# Patient Record
Sex: FEMALE | Race: AMERICAN INDIAN OR ALASKA NATIVE
[De-identification: names, ages, dates, MRNs, and addresses within clinical notes are randomized per-mention and may not be internally consistent; named-entity substitution may affect disease eponyms.]

---

## 2021-01-16 ENCOUNTER — HOSPITAL ENCOUNTER (EMERGENCY)
Dept: HOSPITAL 5 - ED | Age: 31
Discharge: LEFT BEFORE DISCHARGE | End: 2021-01-16
Payer: SELF-PAY

## 2021-01-16 VITALS — SYSTOLIC BLOOD PRESSURE: 120 MMHG | DIASTOLIC BLOOD PRESSURE: 67 MMHG

## 2021-01-16 DIAGNOSIS — Z53.21: ICD-10-CM

## 2021-01-16 DIAGNOSIS — R52: Primary | ICD-10-CM

## 2021-01-16 LAB
BUN SERPL-MCNC: 7 MG/DL (ref 7–17)
BUN/CREAT SERPL: 12 %
CALCIUM SERPL-MCNC: 9.1 MG/DL (ref 8.4–10.2)
HCT VFR BLD CALC: 33.9 % (ref 30.3–42.9)
HEMOLYSIS INDEX: 16
HGB BLD-MCNC: 11.2 GM/DL (ref 10.1–14.3)
MCHC RBC AUTO-ENTMCNC: 33 % (ref 30–34)
MCV RBC AUTO: 79 FL (ref 79–97)
PLATELET # BLD: 284 K/MM3 (ref 140–440)
RBC # BLD AUTO: 4.31 M/MM3 (ref 3.65–5.03)

## 2021-01-16 PROCEDURE — 80048 BASIC METABOLIC PNL TOTAL CA: CPT

## 2021-01-16 PROCEDURE — 85027 COMPLETE CBC AUTOMATED: CPT

## 2021-01-16 PROCEDURE — 36415 COLL VENOUS BLD VENIPUNCTURE: CPT
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
## 2022-02-08 ENCOUNTER — HOSPITAL ENCOUNTER (EMERGENCY)
Dept: HOSPITAL 5 - ED | Age: 32
LOS: 1 days | Discharge: HOME | End: 2022-02-09
Payer: SELF-PAY

## 2022-02-08 VITALS — SYSTOLIC BLOOD PRESSURE: 136 MMHG | DIASTOLIC BLOOD PRESSURE: 60 MMHG

## 2022-02-08 DIAGNOSIS — R07.9: Primary | ICD-10-CM

## 2022-02-08 DIAGNOSIS — Z87.891: ICD-10-CM

## 2022-02-08 DIAGNOSIS — I10: ICD-10-CM

## 2022-02-08 DIAGNOSIS — Z88.6: ICD-10-CM

## 2022-02-08 LAB
ALBUMIN SERPL-MCNC: 4.4 G/DL (ref 3.9–5)
ALT SERPL-CCNC: 13 UNITS/L (ref 7–56)
BASOPHILS # (AUTO): 0.1 K/MM3 (ref 0–0.1)
BASOPHILS NFR BLD AUTO: 0.6 % (ref 0–1.8)
BUN SERPL-MCNC: 6 MG/DL (ref 7–17)
BUN/CREAT SERPL: 12 %
CALCIUM SERPL-MCNC: 9.3 MG/DL (ref 8.4–10.2)
EOSINOPHIL # BLD AUTO: 0.1 K/MM3 (ref 0–0.4)
EOSINOPHIL NFR BLD AUTO: 0.9 % (ref 0–4.3)
HCT VFR BLD CALC: 31.7 % (ref 30.3–42.9)
HEMOLYSIS INDEX: 4
HGB BLD-MCNC: 9.7 GM/DL (ref 10.1–14.3)
LYMPHOCYTES # BLD AUTO: 1.5 K/MM3 (ref 1.2–5.4)
LYMPHOCYTES NFR BLD AUTO: 17.2 % (ref 13.4–35)
MCHC RBC AUTO-ENTMCNC: 31 % (ref 30–34)
MCV RBC AUTO: 74 FL (ref 79–97)
MONOCYTES # (AUTO): 0.7 K/MM3 (ref 0–0.8)
MONOCYTES % (AUTO): 8.3 % (ref 0–7.3)
PLATELET # BLD: 478 K/MM3 (ref 140–440)
RBC # BLD AUTO: 4.3 M/MM3 (ref 3.65–5.03)

## 2022-02-08 PROCEDURE — 93010 ELECTROCARDIOGRAM REPORT: CPT

## 2022-02-08 PROCEDURE — 36415 COLL VENOUS BLD VENIPUNCTURE: CPT

## 2022-02-08 PROCEDURE — 71046 X-RAY EXAM CHEST 2 VIEWS: CPT

## 2022-02-08 PROCEDURE — 85025 COMPLETE CBC W/AUTO DIFF WBC: CPT

## 2022-02-08 PROCEDURE — 80053 COMPREHEN METABOLIC PANEL: CPT

## 2022-02-08 PROCEDURE — 84484 ASSAY OF TROPONIN QUANT: CPT

## 2022-02-08 PROCEDURE — 84703 CHORIONIC GONADOTROPIN ASSAY: CPT

## 2022-02-08 PROCEDURE — 93005 ELECTROCARDIOGRAM TRACING: CPT

## 2022-02-08 PROCEDURE — 99284 EMERGENCY DEPT VISIT MOD MDM: CPT

## 2022-02-08 NOTE — XRAY REPORT
CHEST 2 VIEWS 



INDICATION / CLINICAL INFORMATION: Chest Pain.



COMPARISON: None available.



FINDINGS:



SUPPORT DEVICES: None.

HEART / MEDIASTINUM: No significant abnormality. 

LUNGS / PLEURA: No significant pulmonary or pleural abnormality. No pneumothorax. 



ADDITIONAL FINDINGS: No significant additional findings.



IMPRESSION:

1.  No acute findings.



Signer Name: Alejandro Iqbal MD 

Signed: 2/8/2022 9:06 PM

Workstation Name: [id number]

## 2022-02-08 NOTE — EMERGENCY DEPARTMENT REPORT
ED Chest Pain HPI





- General


Chief Complaint: Chest Pain


Stated Complaint: CHEST PAIN/SOB


Time Seen by Provider: 22 20:12


Source: patient


Mode of arrival: Ambulatory


Limitations: No Limitations





- History of Present Illness


Initial Comments: 


A 31-year-old American female with history of anxiety, surgical history of tubal

ligation, who presents for sided chest pain radiating to left arm with tingling 

and numbness x1 month.  Patient denies symptoms exacerbated by activity movement

and deep breathing.  Symptoms are relieved by nothing tried.  Patient states 

intermittent nausea dizziness at this time.  Patient denies suspicious contacts 

or travel.  There is no shortness of breath.  Patient a 10-pack-year smoker, 

occasional EtOH, denies other history.  Patient denies other exacerbating or 

relieving factors.  Chest pain rated at 4/10 at this time.


MD Complaint: chest pain


Severity scale (0 -10): 5





- Related Data


                                  Previous Rx's











 Medication  Instructions  Recorded  Last Taken  Type


 


Ibuprofen [Motrin 800 MG tab] 800 mg PO Q8HR PRN #30 tablet 22 Unknown Rx











                                    Allergies











Allergy/AdvReac Type Severity Reaction Status Date / Time


 


morphine Allergy  Swelling Verified 10/08/15 15:02














Heart Score





- HEART Score


History: Slightly suspicious


EKG: Normal


Age: < 45


Risk factors: No known risk factors


Troponin: < normal limit


HEART Score: 0





- EKG Read Time


Time EKG Completed: 19:40


EKG Read Time: 19:45





ED Review of Systems


ROS: 


Stated complaint: CHEST PAIN/SOB


Other details as noted in HPI





Constitutional: malaise.  denies: chills, fever


Eyes: denies: eye pain, eye discharge, vision change


ENT: denies: ear pain, throat pain


Respiratory: cough.  denies: shortness of breath, wheezing


Cardiovascular: chest pain


Endocrine: no symptoms reported


Gastrointestinal: as per HPI, nausea, constipation.  denies: abdominal pain, 

vomiting, diarrhea, hematemesis, melena


Genitourinary: denies: urgency, dysuria, frequency, hematuria, discharge


Musculoskeletal: denies: back pain, joint swelling, arthralgia


Skin: denies: rash, lesions


Neurological: denies: headache, weakness, numbness, paresthesias, confusion, 

vertigo


Psychiatric: anxiety.  denies: depression


Hematological/Lymphatic: denies: easy bleeding, easy bruising





ED Past Medical Hx





- Past Medical History


Previous Medical History?: Yes


Hx Hypertension: Yes


Hx Congestive Heart Failure: No


Hx Diabetes: No


Hx Deep Vein Thrombosis: No


Hx Renal Disease: No


Hx Sickle Cell Disease: No


Hx Seizures: No


Hx Asthma: No


Hx COPD: No


Hx HIV: No


Additional medical history: blood transfusion .  UTERINE ABRUPTION 2015





- Surgical History


Past Surgical History?: Yes


Additional Surgical History:  X 3





- Social History


Smoking Status: Former Smoker





- Medications


Home Medications: 


                                Home Medications











 Medication  Instructions  Recorded  Confirmed  Last Taken  Type


 


Ibuprofen [Motrin 800 MG tab] 800 mg PO Q8HR PRN #30 tablet 22  Unknown Rx














ED Physical Exam





- General


Limitations: No Limitations


General appearance: alert, in no apparent distress





- Head


Head exam: Present: atraumatic, normocephalic





- Eye


Eye exam: Present: normal appearance, EOMI


Pupils: Present: normal accommodation





- ENT


ENT exam: Present: mucous membranes moist





- Neck


Neck exam: Present: normal inspection, full ROM.  Absent: tenderness, 

lymphadenopathy, thyromegaly





- Respiratory


Respiratory exam: Present: normal lung sounds bilaterally, chest wall 

tenderness.  Absent: respiratory distress, wheezes, rales, rhonchi, stridor





- Cardiovascular


Cardiovascular Exam: Present: regular rate, normal rhythm, normal heart sounds. 

Absent: systolic murmur, diastolic murmur, rubs, gallop





- GI/Abdominal


GI/Abdominal exam: Present: soft, normal bowel sounds.  Absent: distended, 

tenderness, guarding, rebound, rigid, mass, bruit, hernia





- Rectal


Rectal exam: Present: deferred





- Extremities Exam


Extremities exam: Present: normal inspection, full ROM, normal capillary refill.

 Absent: tenderness, pedal edema





- Back Exam


Back exam: Present: normal inspection, full ROM.  Absent: CVA tenderness (R), 

CVA tenderness (L)





- Neurological Exam


Neurological exam: Present: alert, oriented X3, CN II-XII intact, normal gait





- Expanded Neurological Exam


  ** Expanded


Patient oriented to: Present: person, place, time


Speech: Present: fluid speech


Motor strength exam: RUE: 5, LUE: 5, RLE: 5, LLE: 5


Best Eye Response (Sukumar): (4) open spontaneously


Best Motor Response (Sukumar): (6) obeys commands


Best Verbal Response (Sukumar): (5) oriented


Sukumar Total: 15





- Psychiatric


Psychiatric exam: Present: normal affect, normal mood





- Skin


Skin exam: Present: warm, dry, intact, normal color.  Absent: rash





ED Course


                                   Vital Signs











  22





  19:33 22:38


 


Temperature 98.6 F 


 


Pulse Rate 89 


 


Respiratory 20 16





Rate  


 


Blood Pressure 136/60 





[Right]  


 


O2 Sat by Pulse 100 





Oximetry  














ABHI score





- Abhi Score


Age > 65: (0) No


Aspirin use within the Past 7 Days: (0) No


3 or more CAD Risk Factors: (0) No


2 or more Angina events in past 24 hrs: (0) No


Known CAD with more than 50% Stenosis: (0) No


Elevated Cardiac Markers: (0) No


ST Deviation Greater than 0.5mm: (0) No


ABHI Score: 0





ED Medical Decision Making





- Lab Data


Result diagrams: 


                                 22 19:58





                                 22 19:58








Labs











  22





  19:58 19:58 19:58


 


WBC   8.6 


 


RBC   4.30 


 


Hgb   9.7 L 


 


Hct   31.7 


 


MCV   74 L 


 


MCH   23 L 


 


MCHC   31 


 


RDW   19.3 H 


 


Plt Count   478 H 


 


Lymph % (Auto)   17.2 


 


Mono % (Auto)   8.3 H 


 


Eos % (Auto)   0.9 


 


Baso % (Auto)   0.6 


 


Lymph # (Auto)   1.5 


 


Mono # (Auto)   0.7 


 


Eos # (Auto)   0.1 


 


Baso # (Auto)   0.1 


 


Seg Neutrophils %   73.0 H 


 


Seg Neutrophils #   6.3 


 


Sodium    140


 


Potassium    4.3


 


Chloride    104.2


 


Carbon Dioxide    20 L


 


Anion Gap    20


 


BUN    6 L


 


Creatinine    0.5 L


 


Estimated GFR    > 60


 


BUN/Creatinine Ratio    12


 


Glucose    93


 


Calcium    9.3


 


Total Bilirubin    < 0.20


 


AST    18


 


ALT    13


 


Alkaline Phosphatase    84


 


Troponin T    < 0.010


 


Total Protein    7.8


 


Albumin    4.4


 


Albumin/Globulin Ratio    1.3


 


HCG, Qual  Negative  














  22





  23:01


 


WBC 


 


RBC 


 


Hgb 


 


Hct 


 


MCV 


 


MCH 


 


MCHC 


 


RDW 


 


Plt Count 


 


Lymph % (Auto) 


 


Mono % (Auto) 


 


Eos % (Auto) 


 


Baso % (Auto) 


 


Lymph # (Auto) 


 


Mono # (Auto) 


 


Eos # (Auto) 


 


Baso # (Auto) 


 


Seg Neutrophils % 


 


Seg Neutrophils # 


 


Sodium 


 


Potassium 


 


Chloride 


 


Carbon Dioxide 


 


Anion Gap 


 


BUN 


 


Creatinine 


 


Estimated GFR 


 


BUN/Creatinine Ratio 


 


Glucose 


 


Calcium 


 


Total Bilirubin 


 


AST 


 


ALT 


 


Alkaline Phosphatase 


 


Troponin T  < 0.010


 


Total Protein 


 


Albumin 


 


Albumin/Globulin Ratio 


 


HCG, Qual 














- EKG Data


EKG shows normal: sinus rhythm, axis, intervals, QRS complexes, ST-T waves


Rate: normal





- EKG Data


When compared to previous EKG there are: previous EKG unavailable


Interpretation: normal EKG (NSR No STEMI, interp by ed attending)





- Radiology Data


Radiology results: report reviewed, image reviewed


 


Fluoro Time In Minutes:   


 


CHEST 2 VIEWS   


 


 INDICATION / CLINICAL INFORMATION: Chest Pain.  


 


 COMPARISON: None available.  


 


 FINDINGS:  


 


 SUPPORT DEVICES: None.  


 HEART / MEDIASTINUM: No significant abnormality.   


 LUNGS / PLEURA: No significant pulmonary or pleural abnormality. No 

pneumothorax.   


 


 ADDITIONAL FINDINGS: No significant additional findings.  


 


 IMPRESSION:  


 1.  No acute findings.  


 


 Signer Name: Alejandro Arreguin MD   


 Signed: 2022 9:06 PM  


 Workstation Name: [id number]   


 


 


Transcribed By: SB  


Dictated By: ALEJANDRO ARREGUIN MD  


Electronically Authenticated By: ALEJANDRO ARREGUIN MD    


Signed Date/Time: 22                                


 


 


 


DD/DT: 22                                                            

  


TD/TT:


Print





Critical care attestation.: 


If time is entered above; I have spent that time in minutes in the direct care 

of this critically ill patient, excluding procedure time.








ED Disposition


Clinical Impression: 


Chest pain


Qualifiers:


 Chest pain type: unspecified Qualified Code(s): R07.9 - Chest pain, unspecified





Disposition:  HOME / SELF CARE / HOMELESS


Is pt being admited?: No


Does the pt Need Aspirin: No


Condition: Stable


Instructions:  Nonspecific Chest Pain, Adult, Chest Wall Pain


Additional Instructions: 


Medication as prescribed, follow-up with your doctor in 2 to 3 days.  Return to 

emergency department should symptoms worsen.


Prescriptions: 


Ibuprofen [Motrin 800 MG tab] 800 mg PO Q8HR PRN #30 tablet


 PRN Reason: Pain


Referrals: 


NANCY SANZ MD [Staff Physician] - 3-5 Days


Forms:  Work/School Release Form(ED)


Time of Disposition: 01:32

## 2022-02-09 NOTE — ELECTROCARDIOGRAPH REPORT
[facility]

                                       

Test Date:    2022               Test Time:    19:36:35

Pat Name:     MARLEN QUINTANILLA         Department:   

Patient ID:   [id number]          Room:          

Gender:       F                        Technician:   VAN

:          1990               Requested By: MICKY LA

Order Number: [id number]              Reading MD:   Georges Davies

                                 Measurements

Intervals                              Axis          

Rate:         80                       P:            57

LA:           137                      QRS:          69

QRSD:         89                       T:            36

QT:           386                                    

QTc:          445                                    

                           Interpretive Statements

Sinus rhythm

No previous ECG available for comparison

Electronically Signed On 2022 9:58:06 EST by Georges Davies

## 2022-06-13 ENCOUNTER — HOSPITAL ENCOUNTER (EMERGENCY)
Dept: HOSPITAL 5 - ED | Age: 32
LOS: 1 days | Discharge: LEFT BEFORE DISCHARGE | End: 2022-06-14
Payer: SELF-PAY

## 2022-06-13 DIAGNOSIS — R00.0: Primary | ICD-10-CM

## 2022-06-13 DIAGNOSIS — Z53.21: ICD-10-CM

## 2022-06-13 LAB
ALBUMIN SERPL-MCNC: 4.3 G/DL (ref 3.9–5)
ALT SERPL-CCNC: 9 UNITS/L (ref 7–56)
BASOPHILS # (AUTO): 0.1 K/MM3 (ref 0–0.1)
BASOPHILS NFR BLD AUTO: 0.7 % (ref 0–1.8)
BUN SERPL-MCNC: 8 MG/DL (ref 7–17)
BUN/CREAT SERPL: 16 %
CALCIUM SERPL-MCNC: 8.9 MG/DL (ref 8.4–10.2)
EOSINOPHIL # BLD AUTO: 0.1 K/MM3 (ref 0–0.4)
EOSINOPHIL NFR BLD AUTO: 1 % (ref 0–4.3)
HCT VFR BLD CALC: 30 % (ref 30.3–42.9)
HEMOLYSIS INDEX: 0
HGB BLD-MCNC: 9.4 GM/DL (ref 10.1–14.3)
LYMPHOCYTES # BLD AUTO: 1.5 K/MM3 (ref 1.2–5.4)
LYMPHOCYTES NFR BLD AUTO: 19.9 % (ref 13.4–35)
MCHC RBC AUTO-ENTMCNC: 31 % (ref 30–34)
MCV RBC AUTO: 76 FL (ref 79–97)
MONOCYTES # (AUTO): 0.6 K/MM3 (ref 0–0.8)
MONOCYTES % (AUTO): 7.9 % (ref 0–7.3)
PLATELET # BLD: 349 K/MM3 (ref 140–440)
RBC # BLD AUTO: 3.94 M/MM3 (ref 3.65–5.03)

## 2022-06-13 PROCEDURE — 84484 ASSAY OF TROPONIN QUANT: CPT

## 2022-06-13 PROCEDURE — 85379 FIBRIN DEGRADATION QUANT: CPT

## 2022-06-13 PROCEDURE — 93005 ELECTROCARDIOGRAM TRACING: CPT

## 2022-06-13 PROCEDURE — 36415 COLL VENOUS BLD VENIPUNCTURE: CPT

## 2022-06-13 PROCEDURE — 85025 COMPLETE CBC W/AUTO DIFF WBC: CPT

## 2022-06-13 PROCEDURE — 80053 COMPREHEN METABOLIC PANEL: CPT

## 2022-06-13 PROCEDURE — 71046 X-RAY EXAM CHEST 2 VIEWS: CPT

## 2022-06-13 NOTE — XRAY REPORT
CHEST 2 VIEWS 



INDICATION / CLINICAL INFORMATION:

Chest Pain.



COMPARISON: 

2/8/2022



FINDINGS:



SUPPORT DEVICES: None.



HEART / MEDIASTINUM: No significant abnormality. 



LUNGS / PLEURA: No significant pulmonary or pleural abnormality. No pneumothorax. 



ADDITIONAL FINDINGS: No significant additional findings.



IMPRESSION:

1. No acute findings.



Signer Name: Joseph Falcon Jr, MD 

Signed: 6/13/2022 11:14 AM

Workstation Name: [id number]

## 2022-06-14 NOTE — ELECTROCARDIOGRAPH REPORT
[facility]

                                       

Test Date:    2022               Test Time:    10:48:54

Pat Name:     MARLEN QUINTANILLA         Department:   

Patient ID:   [id number]          Room:          

Gender:       F                        Technician:   REGULO

:          1990               Requested By: GURDEEP JONES

Order Number: [id number]              Reading MD:   Georges Davies

                                 Measurements

Intervals                              Axis          

Rate:         82                       P:            58

MS:           139                      QRS:          47

QRSD:         94                       T:            16

QT:           377                                    

QTc:          439                                    

                           Interpretive Statements

Sinus rhythm

Compared to ECG 2022 19:36:35

No significant changes

Electronically Signed On 2022 10:03:40 EDT by Georges Davies

## 2022-07-31 ENCOUNTER — HOSPITAL ENCOUNTER (INPATIENT)
Dept: HOSPITAL 5 - ED | Age: 32
LOS: 1 days | Discharge: HOME | DRG: 394 | End: 2022-08-01
Attending: INTERNAL MEDICINE | Admitting: INTERNAL MEDICINE
Payer: SELF-PAY

## 2022-07-31 DIAGNOSIS — N39.0: ICD-10-CM

## 2022-07-31 DIAGNOSIS — E87.1: ICD-10-CM

## 2022-07-31 DIAGNOSIS — D25.9: ICD-10-CM

## 2022-07-31 DIAGNOSIS — I10: ICD-10-CM

## 2022-07-31 DIAGNOSIS — D50.9: ICD-10-CM

## 2022-07-31 DIAGNOSIS — K35.80: Primary | ICD-10-CM

## 2022-07-31 LAB
ALBUMIN SERPL-MCNC: 4.2 G/DL (ref 3.9–5)
ALT SERPL-CCNC: 10 UNITS/L (ref 7–56)
BACTERIA #/AREA URNS HPF: (no result) /HPF
BASOPHILS # (AUTO): 0.1 K/MM3 (ref 0–0.1)
BASOPHILS NFR BLD AUTO: 0.7 % (ref 0–1.8)
BILIRUB DIRECT SERPL-MCNC: < 0.2 MG/DL (ref 0–0.2)
BUN SERPL-MCNC: 5 MG/DL (ref 7–17)
BUN/CREAT SERPL: 10 %
CALCIUM SERPL-MCNC: 9.6 MG/DL (ref 8.4–10.2)
EOSINOPHIL # BLD AUTO: 0.1 K/MM3 (ref 0–0.4)
EOSINOPHIL NFR BLD AUTO: 1.1 % (ref 0–4.3)
HCT VFR BLD CALC: 30.5 % (ref 30.3–42.9)
HEMOLYSIS INDEX: 6
HGB BLD-MCNC: 9.6 GM/DL (ref 10.1–14.3)
LYMPHOCYTES # BLD AUTO: 1.8 K/MM3 (ref 1.2–5.4)
LYMPHOCYTES NFR BLD AUTO: 20.5 % (ref 13.4–35)
MCHC RBC AUTO-ENTMCNC: 31 % (ref 30–34)
MCV RBC AUTO: 75 FL (ref 79–97)
MONOCYTES # (AUTO): 0.7 K/MM3 (ref 0–0.8)
MONOCYTES % (AUTO): 8.1 % (ref 0–7.3)
MUCOUS THREADS #/AREA URNS HPF: (no result) /HPF
PH UR STRIP: 6 [PH] (ref 5–7)
PLATELET # BLD: 467 K/MM3 (ref 140–440)
PROT UR STRIP-MCNC: >500 MG/DL
RBC # BLD AUTO: 4.05 M/MM3 (ref 3.65–5.03)
RBC #/AREA URNS HPF: > 182 /HPF (ref 0–6)
UROBILINOGEN UR-MCNC: 2 MG/DL (ref ?–2)
WBC #/AREA URNS HPF: > 182 /HPF (ref 0–6)

## 2022-07-31 PROCEDURE — 74177 CT ABD & PELVIS W/CONTRAST: CPT

## 2022-07-31 PROCEDURE — 83690 ASSAY OF LIPASE: CPT

## 2022-07-31 PROCEDURE — 80048 BASIC METABOLIC PNL TOTAL CA: CPT

## 2022-07-31 PROCEDURE — 93975 VASCULAR STUDY: CPT

## 2022-07-31 PROCEDURE — 36415 COLL VENOUS BLD VENIPUNCTURE: CPT

## 2022-07-31 PROCEDURE — 85025 COMPLETE CBC W/AUTO DIFF WBC: CPT

## 2022-07-31 PROCEDURE — 80076 HEPATIC FUNCTION PANEL: CPT

## 2022-07-31 PROCEDURE — 81001 URINALYSIS AUTO W/SCOPE: CPT

## 2022-07-31 PROCEDURE — 76830 TRANSVAGINAL US NON-OB: CPT

## 2022-07-31 PROCEDURE — 84703 CHORIONIC GONADOTROPIN ASSAY: CPT

## 2022-07-31 PROCEDURE — 80053 COMPREHEN METABOLIC PANEL: CPT

## 2022-07-31 RX ADMIN — Medication SCH ML: at 23:41

## 2022-07-31 RX ADMIN — PIPERACILLIN AND TAZOBACTAM SCH MLS/HR: 4; .5 INJECTION, POWDER, FOR SOLUTION INTRAVENOUS at 23:30

## 2022-07-31 RX ADMIN — HEPARIN SODIUM SCH UNIT: 5000 INJECTION, SOLUTION INTRAVENOUS; SUBCUTANEOUS at 23:41

## 2022-07-31 NOTE — ULTRASOUND REPORT
EXAM: COMPLETE PELVIC ULTRASOUND, 7/31/2022



CLINICAL INFORMATION/INDICATION: Pelvic pain and vaginal bleeding



COMPARISON: No relevant prior study is available for comparison.



FINDINGS:



UTERUS: The uterus measures 10.0 x 5.0 x 6.4 cm. The endometrial complex measures a maximum of 3 mm. 
There is a hypoechoic focus within the uterine body measuring a maximum of 1 cm.



RIGHT ADNEXA: The right ovary measures 3.3 x 1.1 cm. Doppler flow is demonstrated to the right adnexa
l region.



LEFT ADNEXA: The left ovary measures 2.3 x 1.4 cm. Doppler flow is demonstrated to the left adnexal r
egion.



FREE FLUID: No free pelvic fluid is identified.



IMPRESSION:

1.  No sonographic evidence of acute intrapelvic abnormality.

2. Single hypoechoic focus within the uterine body measuring a maximum of 1 cm. This may represent a 
small uterine fibroid.



Signer Name: Paris Duffy MD 

Signed: 7/31/2022 1:46 PM

Workstation Name: [id number]

## 2022-07-31 NOTE — EMERGENCY DEPARTMENT REPORT
ED General Adult HPI





- General


Chief complaint: Abdominal Pain


Stated complaint: BLEEDING


Source: patient, RN notes reviewed, old records reviewed


Mode of arrival: Ambulatory


Limitations: No Limitations





- History of Present Illness


Initial comments: 





The patient was evaluated in the emergency department for symptoms described in 

the history of present illness.  He/she was evaluated in the context of the 

global COVID-19 pandemic, which necessitated consideration that the patient 

might be at risk for infection with the virus that causes COVID-19.  

Institutional protocols and algorithms that pertain to the evaluation of 

patients at risk for COVID-19 are in a state of rapid change based on 

information released by regulatory bodies including the CDC and federal and 

state organizations.  These policies and algorithms were followed during the 

patient's care in the emergency department.  Please note that these policies, 

procedures and recommendations changed on a rapid basis.








During the history and physical examination I am chaperoned by nurse  

NILS LOERA








This is a 31-year-old female who presents to the department today with a primary

complaint of right flank, right lower quadrant pain, right posterior flank pain,

present for 4 years, this got worse over the past week.  She also endorses 

vaginal bleeding.  Questionable dysuria.  No history of vaginal discharge.  Also

endorses nonspecific right-sided foot numbness.





Denies headache, neck pain, chest pain, hematemesis and bright red blood per 

rectum.  No recent antibiotic use


-: Gradual, days(s), year(s)


Location: abdomen


Radiation: abdomen


Quality: aching


Consistency: intermittent


Improves with: rest


Worsens with: movement (Movement and palpation)





- Related Data


                                  Previous Rx's











 Medication  Instructions  Recorded  Last Taken  Type


 


Ibuprofen [Motrin 800 MG tab] 800 mg PO Q8HR PRN #30 tablet 22 Unknown Rx


 


hydrOXYzine HCL [Atarax] 10 mg PO Q6HR PRN #12 tablet 22 Unknown Rx











                                    Allergies











Allergy/AdvReac Type Severity Reaction Status Date / Time


 


morphine Allergy  Swelling Verified 10/08/15 15:02














ED Review of Systems


ROS: 


Stated complaint: BLEEDING


Other details as noted in HPI





Constitutional: denies: fever


Eyes: denies: eye discharge


ENT: denies: epistaxis


Respiratory: denies: cough


Cardiovascular: denies: chest pain


Gastrointestinal: abdominal pain, nausea, vomiting, constipation


Genitourinary: dysuria, hematuria


Musculoskeletal: back pain


Neurological: denies: weakness


Hematological/Lymphatic: denies: easy bleeding





ED Past Medical Hx





- Past Medical History


Hx Hypertension: Yes


Hx Congestive Heart Failure: No


Hx Diabetes: No


Hx Deep Vein Thrombosis: No


Hx Renal Disease: No


Hx Sickle Cell Disease: No


Hx Seizures: No


Hx Asthma: No


Hx COPD: No


Hx HIV: No


Additional medical history: blood transfusion .  UTERINE ABRUPTION 2015.  anemia





- Surgical History


Additional Surgical History:  X 3





- Social History


Smoking Status: Never Smoker


Substance Use Type: None





- Medications


Home Medications: 


                                Home Medications











 Medication  Instructions  Recorded  Confirmed  Last Taken  Type


 


Ibuprofen [Motrin 800 MG tab] 800 mg PO Q8HR PRN #30 tablet 22  Unknown Rx


 


hydrOXYzine HCL [Atarax] 10 mg PO Q6HR PRN #12 tablet 22  Unknown Rx














ED Physical Exam





- General


Limitations: No Limitations


General appearance: alert, in no apparent distress, obese





- Head


Head exam: Present: atraumatic, normocephalic





- Eye


Eye exam: Present: normal appearance, EOMI.  Absent: nystagmus





- ENT


ENT exam: Present: normal exam, normal orophraynx, mucous membranes moist, 

normal external ear exam





- Neck


Neck exam: Present: normal inspection, full ROM.  Absent: tenderness, 

meningismus





- Respiratory


Respiratory exam: Present: normal lung sounds bilaterally.  Absent: respiratory 

distress, wheezes, rales, rhonchi, stridor, decreased breath sounds





- Cardiovascular


Cardiovascular Exam: Present: regular rate, normal rhythm, normal heart sounds. 

 Absent: bradycardia, tachycardia, irregular rhythm, systolic murmur, diastolic 

murmur, rubs, gallop





- GI/Abdominal


GI/Abdominal exam: Present: soft, tenderness, other (There is right upper 

quadrant tenderness.  There is right flank and right lower quadrant tenderness).

  Absent: distended, guarding, rebound, rigid, pulsatile mass





- 


External exam: Present: normal external exam, bleeding


Speculum exam: Present: normal speculum exam, vaginal bleeding.  Absent: erythem

a, vaginal discharge, cervical discharge, tissue, laceration


Bi-manual exam: Present: normal bi-manual exam, uterine tenderness, other 

(Chaperoned by nurse NICOLASA HARPER).  Absent: cervical motion tendernes, 

adnexal tenderness, adnexal mass, uterine enlargement





- Extremities Exam


Extremities exam: Present: normal inspection, full ROM, other (2+ pulses noted 

in the bilateral upper and lower extremities.  There is no palpable cord.   

negative Homans sign.  Muscular compartments are soft.  The pelvis is stable.). 

 Absent: pedal edema, calf tenderness





- Back Exam


Back exam: Present: normal inspection.  Absent: tenderness, CVA tenderness (R), 

CVA tenderness (L), paraspinal tenderness





- Neurological Exam


Neurological exam: Present: alert, oriented X3, other (No facial droop.  Tongue 

midline.  Extraocular movements intact bilaterally.  Facial sensation intact to 

light touch in V1, V2, V3 distribution bilaterally.  5 and a 5 strength in 4 

extremities.  Sensation intact to light touch in 4 extremities.).  Absent: motor

 sensory deficit





- Psychiatric


Psychiatric exam: Present: normal affect, normal mood





- Skin


Skin exam: Present: warm, dry, intact, normal color.  Absent: rash





ED Course


                                   Vital Signs











  22





  04:45 12:34 12:46


 


Temperature 98.9 F  


 


Pulse Rate 85  


 


Respiratory 18  





Rate   


 


Blood Pressure 125/62  121/50


 


O2 Sat by Pulse 100 100 100





Oximetry   


 


O2 Sat by Pulse   





Oximetry [   





Digit-Finger]   














  22





  13:34 13:45 14:18


 


Temperature   


 


Pulse Rate  59 L 


 


Respiratory  16 18





Rate   


 


Blood Pressure 111/50 121/50 


 


O2 Sat by Pulse 100 99 100





Oximetry   


 


O2 Sat by Pulse   





Oximetry [   





Digit-Finger]   














  22





  14:42


 


Temperature 


 


Pulse Rate 


 


Respiratory 





Rate 


 


Blood Pressure 


 


O2 Sat by Pulse 





Oximetry 


 


O2 Sat by Pulse 99





Oximetry [ 





Digit-Finger] 














- Reevaluation(s)


Reevaluation #1: 





22 13:01


Differential diagnosis, including but not limited to: Biliary colic, 

cholecystitis, hepatitis, pancreatitis, renal colic, pyelonephritis, 

appendicitis, dysfunctional uterine bleeding, urinary tract infection











Assessment and plan: 31-year-old female presenting to the department today with 

a complaint of abdominal pain and flank pain, dysuria, and vaginal bleeding.  We

will treat her symptoms, and obtain CT scan abdomen pelvis, pelvic ultrasound, 

and perform a gynecologic examination.  Morphine "allergy" appreciated, patient 

has received Dilaudid as well as oral opioids at this hospital in the past.  We 

will medicate her with Toradol as well as fentanyl.





Add on lipase and hepatic panel at this time.  We will also perform a 

gynecologic examination which the patient has provided consent for.  All 

questions have been answered.  Currently awaiting diagnostic studies


Reevaluation #2: 





22 13:03


GCS 15, NIH score of 0, downgoing plantar reflexes, 2+ quadriceps and biceps 

reflexes bilaterally, nonfocal and unremarkable neurologic examination.  

Expectant management and outpatient follow-up for history of nonspecific foot 

numbness


22 14:41


Nursing team reports to myself that patient's  brought the patient c

[employer] fast food to eat, in spite of an n.p.o. order.  Nursing team further 

reports that the patient was able to eat without difficulty.  Awaiting CT scan 

abdomen pelvis.  We will also perform gynecologic examination


22 15:19


Pelvic examination benign and unremarkable.  CT scan suggests early acute 

appendicitis.  Patient is agreeable to admission and hospitalization for 

supportive care, fluids and antibiotics.  Have again advised patient to not eat 

anything at this time.  Contacted general surgery on-call, Dr. Newsome.  Have 

discussed the patient's history, physical, laboratory studies and imaging 

studies.  Indicates that he can follow in consultation.  Request n.p.o. after 

midnight, and clear liquid diet at this time.





Hospital physician, Dr. JORGE Lopez to admit to [facility]





- Pulse Oximetry Interpretation


  ** Digit-Finger


Initial Pulse Oximetry Readin


O2 Sat by Pulse Oximetry: 99


Actions Taken: none





ED Medical Decision Making





- Lab Data


Result diagrams: 


                                 22 04:56





                                 22 04:56








                                   Vital Signs











  22





  04:45 13:03


 


Temperature 98.9 F 


 


Pulse Rate 85 


 


Respiratory 18 





Rate  


 


Blood Pressure 125/62 


 


O2 Sat by Pulse 100 





Oximetry  


 


O2 Sat by Pulse  99





Oximetry [  





Digit-Finger]  











                                   Lab Results











  22 Range/Units





  04:56 04:56 04:56 


 


WBC  8.9    (4.5-11.0)  K/mm3


 


RBC  4.05    (3.65-5.03)  M/mm3


 


Hgb  9.6 L    (10.1-14.3)  gm/dl


 


Hct  30.5    (30.3-42.9)  %


 


MCV  75 L    (79-97)  fl


 


MCH  24 L    (28-32)  pg


 


MCHC  31    (30-34)  %


 


RDW  20.5 H    (13.2-15.2)  %


 


Plt Count  467 H    (140-440)  K/mm3


 


Lymph % (Auto)  20.5    (13.4-35.0)  %


 


Mono % (Auto)  8.1 H    (0.0-7.3)  %


 


Eos % (Auto)  1.1    (0.0-4.3)  %


 


Baso % (Auto)  0.7    (0.0-1.8)  %


 


Lymph # (Auto)  1.8    (1.2-5.4)  K/mm3


 


Mono # (Auto)  0.7    (0.0-0.8)  K/mm3


 


Eos # (Auto)  0.1    (0.0-0.4)  K/mm3


 


Baso # (Auto)  0.1    (0.0-0.1)  K/mm3


 


Seg Neutrophils %  69.6    (40.0-70.0)  %


 


Seg Neutrophils #  6.2    (1.8-7.7)  K/mm3


 


Sodium   136 L   (137-145)  mmol/L


 


Potassium   3.6   (3.6-5.0)  mmol/L


 


Chloride   101.4   ()  mmol/L


 


Carbon Dioxide   21 L   (22-30)  mmol/L


 


Anion Gap   17   mmol/L


 


BUN   5 L   (7-17)  mg/dL


 


Creatinine   0.5 L   (0.6-1.2)  mg/dL


 


Estimated GFR   > 60   ml/min


 


BUN/Creatinine Ratio   10   %


 


Glucose   100   ()  mg/dL


 


Calcium   9.6   (8.4-10.2)  mg/dL


 


Total Bilirubin     (0.1-1.2)  mg/dL


 


Direct Bilirubin     (0-0.2)  mg/dL


 


Indirect Bilirubin     mg/dL


 


AST     (5-40)  units/L


 


ALT     (7-56)  units/L


 


Alkaline Phosphatase     ()  units/L


 


Total Protein     (6.3-8.2)  g/dL


 


Albumin     (3.9-5)  g/dL


 


Albumin/Globulin Ratio     %


 


Lipase     (13-60)  units/L


 


HCG, Qual    Negative  (Negative)  


 


Urine Color     (Yellow)  


 


Urine Turbidity     (Clear)  


 


Urine pH     (5.0-7.0)  


 


Ur Specific Gravity     (1.003-1.030)  


 


Urine Protein     (Negative)  mg/dL


 


Urine Glucose (UA)     (Negative)  mg/dL


 


Urine Ketones     (Negative)  mg/dL


 


Urine Blood     (Negative)  


 


Urine Nitrite     (Negative)  


 


Ur Reducing Substances     


 


Urine Bilirubin     (Negative)  


 


Urine Ictotest     


 


Urine Urobilinogen     (<2.0)  mg/dL


 


Ur Leukocyte Esterase     (Negative)  


 


Urine WBC (Auto)     (0.0-6.0)  /HPF


 


Urine RBC (Auto)     (0.0-6.0)  /HPF


 


U Epithel Cells (Auto)     (0-13.0)  /HPF


 


Urine Bacteria (Auto)     (Negative)  /HPF


 


Urine WBC Clumps     /HPF


 


Urine Mucus     /HPF














  22 Range/Units





  12:31 Unknown 


 


WBC    (4.5-11.0)  K/mm3


 


RBC    (3.65-5.03)  M/mm3


 


Hgb    (10.1-14.3)  gm/dl


 


Hct    (30.3-42.9)  %


 


MCV    (79-97)  fl


 


MCH    (28-32)  pg


 


MCHC    (30-34)  %


 


RDW    (13.2-15.2)  %


 


Plt Count    (140-440)  K/mm3


 


Lymph % (Auto)    (13.4-35.0)  %


 


Mono % (Auto)    (0.0-7.3)  %


 


Eos % (Auto)    (0.0-4.3)  %


 


Baso % (Auto)    (0.0-1.8)  %


 


Lymph # (Auto)    (1.2-5.4)  K/mm3


 


Mono # (Auto)    (0.0-0.8)  K/mm3


 


Eos # (Auto)    (0.0-0.4)  K/mm3


 


Baso # (Auto)    (0.0-0.1)  K/mm3


 


Seg Neutrophils %    (40.0-70.0)  %


 


Seg Neutrophils #    (1.8-7.7)  K/mm3


 


Sodium    (137-145)  mmol/L


 


Potassium    (3.6-5.0)  mmol/L


 


Chloride    ()  mmol/L


 


Carbon Dioxide    (22-30)  mmol/L


 


Anion Gap    mmol/L


 


BUN    (7-17)  mg/dL


 


Creatinine    (0.6-1.2)  mg/dL


 


Estimated GFR    ml/min


 


BUN/Creatinine Ratio    %


 


Glucose    ()  mg/dL


 


Calcium    (8.4-10.2)  mg/dL


 


Total Bilirubin  0.20   (0.1-1.2)  mg/dL


 


Direct Bilirubin  < 0.2   (0-0.2)  mg/dL


 


Indirect Bilirubin  0.0   mg/dL


 


AST  13   (5-40)  units/L


 


ALT  10   (7-56)  units/L


 


Alkaline Phosphatase  70   ()  units/L


 


Total Protein  7.1   (6.3-8.2)  g/dL


 


Albumin  4.2   (3.9-5)  g/dL


 


Albumin/Globulin Ratio  1.4   %


 


Lipase  16   (13-60)  units/L


 


HCG, Qual    (Negative)  


 


Urine Color   Red  (Yellow)  


 


Urine Turbidity   Cloudy  (Clear)  


 


Urine pH   6.0  (5.0-7.0)  


 


Ur Specific Gravity   1.030  (1.003-1.030)  


 


Urine Protein   >500  (Negative)  mg/dL


 


Urine Glucose (UA)   Negative  (Negative)  mg/dL


 


Urine Ketones   Trace  (Negative)  mg/dL


 


Urine Blood   Large A  (Negative)  


 


Urine Nitrite   Negative  (Negative)  


 


Ur Reducing Substances   Not Reportable  


 


Urine Bilirubin   Negative  (Negative)  


 


Urine Ictotest   Not Reportable  


 


Urine Urobilinogen   2.0  (<2.0)  mg/dL


 


Ur Leukocyte Esterase   Moderate  (Negative)  


 


Urine WBC (Auto)   > 182.0 H  (0.0-6.0)  /HPF


 


Urine RBC (Auto)   > 182.0  (0.0-6.0)  /HPF


 


U Epithel Cells (Auto)   2.0  (0-13.0)  /HPF


 


Urine Bacteria (Auto)   2+  (Negative)  /HPF


 


Urine WBC Clumps   3+  /HPF


 


Urine Mucus   2+  /HPF














- Radiology Data


Radiology results: pending, report reviewed, image reviewed





EXAM: COMPLETE PELVIC ULTRASOUND, 2022  CLINICAL INFORMATION/INDICATION: 

Pelvic pain and vaginal bleeding  COMPARISON: No relevant prior study is 

available for comparison.  FINDINGS:  UTERUS: The uterus measures 10.0 x 5.0 x 

6.4 cm. The endometrial complex measures a maximum of 3 mm. There is a 

hypoechoic focus within the uterine body measuring a maximum of 1 cm.  RIGHT 

ADNEXA: The right ovary measures 3.3 x 1.1 cm. Doppler flow is demonstrated to 

the right adnexal region.  LEFT ADNEXA: The left ovary measures 2.3 x 1.4 cm. 

Doppler flow is demonstrated to the left adnexal region.  FREE FLUID: No free 

pelvic fluid is identified.  IMPRESSION: 1. No sonographic evidence of acute 

intrapelvic abnormality. 2. Single hypoechoic focus within the uterine body 

measuring a maximum of 1 cm. This may represent a small uterine fibroid.  Signer

Name: Paris Duffy MD Signed: 2022 12:46 PM Workstation Name: [id number]








EXAM: COMPLETE PELVIC ULTRASOUND, 2022  CLINICAL INFORMATION/INDICATION: 

Pelvic pain and vaginal bleeding  COMPARISON: No relevant prior study is 

available for comparison.  FINDINGS:  UTERUS: The uterus measures 10.0 x 5.0 x 

6.4 cm. The endometrial complex measures a maximum of 3 mm. There is a 

hypoechoic focus within the uterine body measuring a maximum of 1 cm.  RIGHT 

ADNEXA: The right ovary measures 3.3 x 1.1 cm. Doppler flow is demonstrated to 

the right adnexal region.  LEFT ADNEXA: The left ovary measures 2.3 x 1.4 cm. 

Doppler flow is demonstrated to the left adnexal region.  FREE FLUID: No free 

pelvic fluid is identified.  IMPRESSION: 1. No sonographic evidence of acute 

intrapelvic abnormality. 2. Single hypoechoic focus within the uterine body 

measuring a maximum of 1 cm. This may represent a small uterine fibroid.  Signer

Name: Paris Duffy MD Signed: 2022 12:46 PM Workstation Name: [id number]








[place] [address]  


 


                                          Cat Scan Report   


                                               Signed  


 


Patient: MARLEN QUINTANILLA                                                 

              MR#:   


[id number]          


: 1990                                                                

Acct:[id number]      


 


Age/Sex: 31 / F                                                                A

DM Date: 22     


 


Loc: ED       


Attending Dr:   


 


 


Ordering Physician: ROB WALTER MD  


Date of Service: 22  


Procedure(s): CT abdomen pelvis w con  


Accession Number(s): [id number]  


 


cc: ROB WALTER MD   


 


 


CT ABDOMEN AND PELVIS WITH IV CONTRAST, 2022  


 


 INDICATION: Right lower quadrant pain. Right flank pain.   


 


 TECHNIQUE: Following the administration of intravenous contrast, multiple axial

CT images of the 


abdomen and pelvis were acquired. Sagittal and coronal reformats were obtained. 

All CT performed at


this facility utilize dose reduction techniques including automated exposure 

control, iterative 


reconstruction and weight based dosing when appropriate to reduce patient 

radiation dose to as low 


as reasonably achievable.    


 


 COMPARISON: Pelvic ultrasound, 2022. No prior cross-sectional imaging is 

available for 


comparison.  


 


 FINDINGS:  


 


 Lung bases: Limited imaging of the bilateral lung bases demonstrates no focal 

abnormality.  


 


 ABDOMEN:   


 


 LIVER/BILE DUCTS: No significant abnormality.  


 GALL BLADDER: No significant abnormality.  


 STOMACH: No significant abnormality.  


 PANCREAS: No significant abnormality.  


 SPLEEN: No significant abnormality.  


 ADRENALS: No significant abnormality.  


 RIGHT KIDNEY / URETER: No significant abnormality.  


 LEFT KIDNEY / URETER: No significant abnormality.  


 AORTA: The abdominal aorta is normal in caliber.  


 LYMPH NODES: No significant abnormality.  


 SMALL AND LARGE BOWEL: No significant abnormality.  


 APPENDIX: The appendix is air-filled and mildly prominent in caliber measuring 

6-7 mm with 


suspected mild surrounding inflammatory change.  


 


 PELVIS: There is a small amount of endometrial fluid. There has been previous 

adnexal banding 


procedure. The urinary bladder appears normal. There is no free pelvic fluid.  


 


 BONES AND SOFT TISSUES: No significant abnormality.  


 


 IMPRESSION:  


 1. Mild prominence of the appendix with suspected surrounding inflammatory 

change that may suggest 


mild or early appendicitis.  


 


 Signer Name: Paris Duffy MD   


 Signed: 2022 3:01 PM  


 Workstation Name: [id number]   


 


 


Transcribed By: EB  


Dictated By: Paris Duffy MD  


Electronically Authenticated By: Paris Duffy MD    


Signed Date/Time: 22 1501                                


 


 


 


DD/DT: 22 1445                                                            

 


TD/TT:


Critical care attestation.: 


If time is entered above; I have spent that time in minutes in the direct care 

of this critically ill patient, excluding procedure time.








ED Disposition


Clinical Impression: 


 Acute abdominal pain, Dysmenorrhea, Uterine fibroid, Acute appendicitis





Disposition: 09 ADMITTED AS INPATIENT


Is pt being admited?: Yes


Does the pt Need Aspirin: No


Condition: Good


Instructions:  Abdominal Pain (ED)


Referrals: 


NANCY SANZ MD [Primary Care Provider] - 3-5 Days

## 2022-07-31 NOTE — CAT SCAN REPORT
CT ABDOMEN AND PELVIS WITH IV CONTRAST, 7/31/2022



INDICATION: Right lower quadrant pain. Right flank pain. 



TECHNIQUE: Following the administration of intravenous contrast, multiple axial CT images of the abdo
men and pelvis were acquired. Sagittal and coronal reformats were obtained.  All CT performed at this
 facility utilize dose reduction techniques including automated exposure control, iterative reconstru
ction and weight based dosing when appropriate to reduce patient radiation dose to as low as reasonab
ly achievable.  



COMPARISON: Pelvic ultrasound, 7/31/2022. No prior cross-sectional imaging is available for compariso
n.



FINDINGS:



Lung bases: Limited imaging of the bilateral lung bases demonstrates no focal abnormality.



ABDOMEN: 



LIVER/BILE DUCTS: No significant abnormality.

GALL BLADDER: No significant abnormality.

STOMACH: No significant abnormality.

PANCREAS: No significant abnormality.

SPLEEN: No significant abnormality.

ADRENALS: No significant abnormality.

RIGHT KIDNEY / URETER: No significant abnormality.

LEFT KIDNEY / URETER: No significant abnormality.

AORTA: The abdominal aorta is normal in caliber.

LYMPH NODES: No significant abnormality.

SMALL AND LARGE BOWEL: No significant abnormality.

APPENDIX: The appendix is air-filled and mildly prominent in caliber measuring 6-7 mm with suspected 
mild surrounding inflammatory change.



PELVIS: There is a small amount of endometrial fluid. There has been previous adnexal banding procedu
re. The urinary bladder appears normal. There is no free pelvic fluid.



BONES AND SOFT TISSUES: No significant abnormality.



IMPRESSION:

1. Mild prominence of the appendix with suspected surrounding inflammatory change that may suggest mi
ld or early appendicitis.



Signer Name: Paris Duffy MD 

Signed: 7/31/2022 3:01 PM

Workstation Name: [email or web address]

## 2022-07-31 NOTE — HISTORY AND PHYSICAL REPORT
History of Present Illness


Date of examination: 22


Date of admission: 


2022


Chief complaint: 


Acute abdominal pain for 1 week





History of present illness: 


31-year-old female with no significant past medical history except for 

hypertension comes in for right lower quadrant pain of 1 week duration.  

Associated with nausea.  No vomiting.  Pain is about 5-6 on a scale of 1-10.  

Most so today about 8 on a scale of 1-10.  No vaginal bleeding or discharge.  No

fever or chills.  No exacerbating or relieving factors.  Pain is sharp in 

nature.














- Past Medical History


--Hypertension: Yes


--Blood transfusion .  UTERINE ABRUPTION 2015.  anemia





- Surgical History


--Additional Surgical History:  X 3





- Social History


--Smoking Status: Never Smoker


--Substance Use Type: None





- Medications


--Home Medications: 


                                Home Medications











 Medication  Instructions  Recorded  Confirmed  Last Taken  Type


 


Ibuprofen [Motrin 800 MG tab] 800 mg PO Q8HR PRN #30 tablet 22  Unknown Rx


 


hydrOXYzine HCL [Atarax] 10 mg PO Q6HR PRN #12 tablet 22  Unknown Rx














 Review of Systems 


ROS: 


Stated complaint: BLEEDING


Other details as noted in HPI





Constitutional: denies: fever


Eyes: denies: eye discharge


ENT: denies: epistaxis


Respiratory: denies: cough


Cardiovascular: denies: chest pain


Gastrointestinal: abdominal pain, nausea, vomiting, constipation


Genitourinary: dysuria, hematuria


Musculoskeletal: back pain


Neurological: denies: weakness


Hematological/Lymphatic: denies: easy bleeding




















Medications and Allergies


                                    Allergies











Allergy/AdvReac Type Severity Reaction Status Date / Time


 


morphine Allergy  Swelling Verified 10/08/15 15:02











                                Home Medications











 Medication  Instructions  Recorded  Confirmed  Last Taken  Type


 


Ibuprofen [Motrin 800 MG tab] 800 mg PO Q8HR PRN #30 tablet 22  Unknown Rx


 


hydrOXYzine HCL [Atarax] 10 mg PO Q6HR PRN #12 tablet 22  Unknown Rx














Exam





- Constitutional


Vitals: 


                                        











Temp Pulse Resp BP Pulse Ox


 


 98.9 F   59 L  18   121/50   99 


 


 22 04:45  22 13:45  22 14:18  22 13:45  22 15:20











General appearance: Present: no acute distress, well-nourished





- EENT


Eyes: Present: PERRL


ENT: hearing intact, clear oral mucosa





- Neck


Neck: Present: supple, normal ROM





- Respiratory


Respiratory effort: normal


Respiratory: bilateral: CTA





- Cardiovascular


Heart rate: 78


Rhythm: regular


Heart Sounds: Present: S1 & S2.  Absent: rub, click





- Extremities


Extremities: no ischemia, pulses symmetrical, No edema


Peripheral Pulses: within normal limits





- Abdominal


General gastrointestinal: Present: soft, non-tender, non-distended, normal bowel

 sounds


Localized gastrointestinal: tender: RLQ, guarding: RLQ, rebound: RLQ


Female genitourinary: Present: normal





- Integumentary


Integumentary: Present: clear, warm, dry





- Musculoskeletal


Musculoskeletal: gait normal, strength equal bilaterally





- Psychiatric


Psychiatric: appropriate mood/affect, intact judgment & insight





- Neurologic


Neurologic: CNII-XII intact, moves all extremities





- Allied Health


Allied health notes reviewed: nursing, case management





Results





- Labs


CBC & Chem 7: 


                                 22 04:56





                                 22 04:56


Labs: 


                             Laboratory Last Values











WBC  8.9 K/mm3 (4.5-11.0)   22  04:56    


 


RBC  4.05 M/mm3 (3.65-5.03)   22  04:56    


 


Hgb  9.6 gm/dl (10.1-14.3)  L  22  04:56    


 


Hct  30.5 % (30.3-42.9)   22  04:56    


 


MCV  75 fl (79-97)  L  22  04:56    


 


MCH  24 pg (28-32)  L  22  04:56    


 


MCHC  31 % (30-34)   22  04:56    


 


RDW  20.5 % (13.2-15.2)  H  22  04:56    


 


Plt Count  467 K/mm3 (140-440)  H  22  04:56    


 


Lymph % (Auto)  20.5 % (13.4-35.0)   22  04:56    


 


Mono % (Auto)  8.1 % (0.0-7.3)  H  22  04:56    


 


Eos % (Auto)  1.1 % (0.0-4.3)   22  04:56    


 


Baso % (Auto)  0.7 % (0.0-1.8)   22  04:56    


 


Lymph # (Auto)  1.8 K/mm3 (1.2-5.4)   22  04:56    


 


Mono # (Auto)  0.7 K/mm3 (0.0-0.8)   22  04:56    


 


Eos # (Auto)  0.1 K/mm3 (0.0-0.4)   22  04:56    


 


Baso # (Auto)  0.1 K/mm3 (0.0-0.1)   22  04:56    


 


Seg Neutrophils %  69.6 % (40.0-70.0)   22  04:56    


 


Seg Neutrophils #  6.2 K/mm3 (1.8-7.7)   22  04:56    


 


Sodium  136 mmol/L (137-145)  L  22  04:56    


 


Potassium  3.6 mmol/L (3.6-5.0)   22  04:56    


 


Chloride  101.4 mmol/L ()   22  04:56    


 


Carbon Dioxide  21 mmol/L (22-30)  L  22  04:56    


 


Anion Gap  17 mmol/L  22  04:56    


 


BUN  5 mg/dL (7-17)  L  22  04:56    


 


Creatinine  0.5 mg/dL (0.6-1.2)  L  22  04:56    


 


Estimated GFR  > 60 ml/min  22  04:56    


 


BUN/Creatinine Ratio  10 %  22  04:56    


 


Glucose  100 mg/dL ()   22  04:56    


 


Calcium


  9.6 mg/dL (8.4-10.2)   22  04:56    


 


Total Bilirubin  0.20 mg/dL (0.1-1.2)   22  12:31    


 


Direct Bilirubin  < 0.2 mg/dL (0-0.2)   22  12:31    


 


Indirect Bilirubin  0.0 mg/dL  22  12:31    


 


AST  13 units/L (5-40)   22  12:31    


 


ALT  10 units/L (7-56)   22  12:31    


 


Alkaline Phosphatase  70 units/L ()   22  12:31    


 


Total Protein  7.1 g/dL (6.3-8.2)   22  12:31    


 


Albumin  4.2 g/dL (3.9-5)   22  12:31    


 


Albumin/Globulin Ratio  1.4 %  22  12:31    


 


Lipase  16 units/L (13-60)   22  12:31    


 


HCG, Qual  Negative  (Negative)   22  04:56    


 


Urine Color  Red  (Yellow)   22  Unknown


 


Urine Turbidity  Cloudy  (Clear)   22  Unknown


 


Urine pH  6.0  (5.0-7.0)   22  Unknown


 


Ur Specific Gravity  1.030  (1.003-1.030)   22  Unknown


 


Urine Protein  >500 mg/dL (Negative)   22  Unknown


 


Urine Glucose (UA)  Negative mg/dL (Negative)   22  Unknown


 


Urine Ketones  Trace mg/dL (Negative)   22  Unknown


 


Urine Blood  Large  (Negative)  A  22  Unknown


 


Urine Nitrite  Negative  (Negative)   22  Unknown


 


Ur Reducing Substances  Not Reportable   22  Unknown


 


Urine Bilirubin  Negative  (Negative)   22  Unknown


 


Urine Ictotest  Not Reportable   22  Unknown


 


Urine Urobilinogen  2.0 mg/dL (<2.0)   22  Unknown


 


Ur Leukocyte Esterase  Moderate  (Negative)   22  Unknown


 


Urine WBC (Auto)  > 182.0 /HPF (0.0-6.0)  H  22  Unknown


 


Urine RBC (Auto)  > 182.0 /HPF (0.0-6.0)   22  Unknown


 


U Epithel Cells (Auto)  2.0 /HPF (0-13.0)   22  Unknown


 


Urine Bacteria (Auto)  2+ /HPF (Negative)   22  Unknown


 


Urine WBC Clumps  3+ /HPF  22  Unknown


 


Urine Mucus  2+ /HPF  22  Unknown














- Imaging and Cardiology


CT scan - abdomen: report reviewed


Imaging and Cardiology: 





Abdominal CAT scan


Mild prominence of the appendix with suspected surrounding inflammatory changes 

that may suggest early appendicitis





Transvaginal ultrasound


No sonographic evidence of acute intrapelvic abnormality


Single hypoechoic focus within the uterine body measuring my abdominal 1 cm this

 may represent a small uterine fibroid





Assessment and Plan


Advance Directives: Yes (Full code)


VTE prophylaxis?: Chemical


Plan of care discussed with patient/family: Yes





- Patient Problems


(1) Acute appendicitis


Current Visit: Yes   Status: Acute   


Qualifiers: 


   Appendicitis gangrene presence: without gangrene   Appendicitis perforation 

presence: without perforation   Appendicitis abscess presence: without abscess 


Plan to address problem: 


IV Zosyn initiated


IV fluids


Surgical consult requested








(2) Urinary tract infection


Current Visit: Yes   Status: Acute   


Qualifiers: 


   Urinary tract infection type: acute cystitis 


Plan to address problem: 


Patient is on Zosyn for acute appendicitis


Check urine cultures and adjust antibiotics








(3) Hyponatremia


Current Visit: Yes   Status: Acute   


Plan to address problem: 


Very mild


IV normal saline








(4) Uterine fibroid


Current Visit: Yes   Status: Chronic   


Qualifiers: 


   Uterine leiomyoma location: intramural   Qualified Code(s): D25.1 - 

Intramural leiomyoma of uterus   


Plan to address problem: 


Follow-up with OB/GYN) as outpatient








(5) Anemia


Current Visit: Yes   Status: Chronic   


Qualifiers: 


   Anemia type: iron deficiency 


Plan to address problem: 


Microcytic anemia


MCV and MCH are low


No iron studies


Patient initiated on ferrous sulfate/gluconate











(6) DVT prophylaxis


Current Visit: Yes   Status: Acute   


Plan to address problem: 


On anticoagulation GI prophylaxis








(7) Advance care planning


Current Visit: Yes   Status: Acute   


Plan to address problem: 


Disease education conducted, care plan discussed, diagnosis discussed and 

prognosis discussed.  Patient acknowledges understanding with care plan.  +30 

minutes.

## 2022-08-01 VITALS — DIASTOLIC BLOOD PRESSURE: 44 MMHG | SYSTOLIC BLOOD PRESSURE: 96 MMHG

## 2022-08-01 LAB
ALBUMIN SERPL-MCNC: 3.4 G/DL (ref 3.9–5)
ALT SERPL-CCNC: 7 UNITS/L (ref 7–56)
BASOPHILS # (AUTO): 0 K/MM3 (ref 0–0.1)
BASOPHILS NFR BLD AUTO: 0.4 % (ref 0–1.8)
BUN SERPL-MCNC: 5 MG/DL (ref 7–17)
BUN/CREAT SERPL: 13 %
CALCIUM SERPL-MCNC: 8.2 MG/DL (ref 8.4–10.2)
EOSINOPHIL # BLD AUTO: 0.1 K/MM3 (ref 0–0.4)
EOSINOPHIL NFR BLD AUTO: 1.7 % (ref 0–4.3)
HCT VFR BLD CALC: 24 % (ref 30.3–42.9)
HEMOLYSIS INDEX: 0
HGB BLD-MCNC: 7.5 GM/DL (ref 10.1–14.3)
LYMPHOCYTES # BLD AUTO: 1.5 K/MM3 (ref 1.2–5.4)
LYMPHOCYTES NFR BLD AUTO: 27.8 % (ref 13.4–35)
MCHC RBC AUTO-ENTMCNC: 31 % (ref 30–34)
MCV RBC AUTO: 75 FL (ref 79–97)
MONOCYTES # (AUTO): 0.4 K/MM3 (ref 0–0.8)
MONOCYTES % (AUTO): 7.4 % (ref 0–7.3)
PLATELET # BLD: 355 K/MM3 (ref 140–440)
RBC # BLD AUTO: 3.2 M/MM3 (ref 3.65–5.03)

## 2022-08-01 RX ADMIN — PIPERACILLIN AND TAZOBACTAM SCH MLS/HR: 4; .5 INJECTION, POWDER, FOR SOLUTION INTRAVENOUS at 05:11

## 2022-08-01 RX ADMIN — PIPERACILLIN AND TAZOBACTAM SCH: 4; .5 INJECTION, POWDER, FOR SOLUTION INTRAVENOUS at 14:22

## 2022-08-01 RX ADMIN — HEPARIN SODIUM SCH UNIT: 5000 INJECTION, SOLUTION INTRAVENOUS; SUBCUTANEOUS at 10:41

## 2022-08-01 RX ADMIN — Medication SCH ML: at 10:41

## 2022-08-01 NOTE — DISCHARGE SUMMARY
Providers





- Providers


Date of Admission: 


07/31/22 18:43





Attending physician: 


MIS ALFONSO





                                        





07/31/22 15:15


Consult to Physician [CONS] Urgent 


   Comment: 


   Consulting Provider: OLEG NGUYEN


   Physician Instructions: 


   Reason For Exam: acute appy











Primary care physician: 


NANCY SANZ








Hospitalization


Condition: Good


Disposition: 30 STILL A PATIENT





Exam





- Constitutional


Vitals: 


                                        











Temp Pulse Resp BP Pulse Ox


 


 97.7 F   65   16   96/44   100 


 


 08/01/22 04:29  08/01/22 04:29  08/01/22 04:29  08/01/22 04:29  08/01/22 04:29














Plan


Follow up with: 


NANCY SANZ MD [Primary Care Provider] - 3-5 Days


Prescriptions: 


Ciprofloxacin HCl 500 mg PO BID #10

## 2022-08-01 NOTE — CONSULTATION
History of Present Illness


Consult date: 08/01/22


Reason for consult: abdominal pain


Requesting physician: CAROL BATEMAN





- History of present illness


History of present illness: 





This is a 31-year-old female who presents to the department today with a primary

complaint of right flank, right lower quadrant pain, right posterior flank pain,

present for 4 years, this got worse over the past week.  She also endorses 

vaginal bleeding.  Questionable dysuria.  No history of vaginal discharge.  Also

endorses nonspecific right-sided foot numbness.





CT of abdomen and indicates possible early acute appendicitis.  Patient poorly 

compliant with n.p.o. request had fast food brought to her by her  and 

eaten yesterday afternoon during evaluation for abdominal pain.





CT reviewed with Dr. Myers this am. No sign of acute appendicitis seen. Pt to be

advanced to a regular diet and when tolerated DC'd. Fu with me in one week.





Medications and Allergies


                                    Allergies











Allergy/AdvReac Type Severity Reaction Status Date / Time


 


morphine Allergy  Swelling Verified 10/08/15 15:02











                                Home Medications











 Medication  Instructions  Recorded  Confirmed  Last Taken  Type


 


Ibuprofen [Motrin 800 MG tab] 800 mg PO Q8HR PRN #30 tablet 02/09/22  Unknown Rx


 


hydrOXYzine HCL [Atarax] 10 mg PO Q6HR PRN #12 tablet 02/09/22  Unknown Rx


 


Ciprofloxacin HCl 500 mg PO BID #10 08/01/22  Unknown Rx











Active Meds: 


Active Medications





Acetaminophen (Acetaminophen 325 Mg Tab)  650 mg PO Q4H PRN


   PRN Reason: Pain MILD(1-3)/Fever >100.5/HA


Heparin Sodium (Porcine) (Heparin 5,000 Unit/1 Ml Vial)  5,000 unit SUB-Q Q12HR 

JENNIFER


   Last Admin: 07/31/22 23:41 Dose:  5,000 unit


   


Hydromorphone HCl (Hydromorphone 0.5 Mg/0.5 Ml Inj)  1 mg IV Q3H PRN


   PRN Reason: Pain , Severe (7-10)


   Last Admin: 07/31/22 23:15 Dose:  1 mg


   


Dextrose/Sodium Chloride (D5ns)  1,000 mls @ 75 mls/hr IV AS DIRECT JENNIFER


   Last Admin: 07/31/22 23:30 Dose:  75 mls/hr


   


Piperacillin Sod/Tazobactam Sod (Zosyn/Ns 4.5gm/100ml)  4.5 gm in 100 mls @ 200 

mls/hr IV Q8HR JENNIFER; Protocol


   Last Admin: 08/01/22 05:11 Dose:  200 mls/hr


   


Ondansetron HCl (Ondansetron 4 Mg/2 Ml Inj)  4 mg IV Q8H PRN


   PRN Reason: Nausea And Vomiting


Sodium Chloride (Sodium Chloride 0.9% 10 Ml Flush Syringe)  10 ml IV BID JENNIFER


   Last Admin: 07/31/22 23:41 Dose:  10 ml


   


Sodium Chloride (Sodium Chloride 0.9% 10 Ml Flush Syringe)  10 ml IV PRN PRN


   PRN Reason: LINE FLUSH


   Stop: 08/11/22 23:59











Exam


                                   Vital Signs











Temp Pulse Resp BP Pulse Ox


 


 98.9 F   85   18   125/62   100 


 


 07/31/22 04:45  07/31/22 04:45  07/31/22 04:45  07/31/22 04:45  07/31/22 04:45














Results





- Labs





                                 08/01/22 07:55





                                 08/01/22 07:55


                                 Diabetes panel











  07/31/22 Range/Units





  12:31 


 


AST  13  (5-40)  units/L


 


ALT  10  (7-56)  units/L


 


Alkaline Phosphatase  70  ()  units/L


 


Total Protein  7.1  (6.3-8.2)  g/dL


 


Albumin  4.2  (3.9-5)  g/dL








                                  Calcium panel











  07/31/22 Range/Units





  12:31 


 


Albumin  4.2  (3.9-5)  g/dL








                                  Adrenal panel











  07/31/22 Range/Units





  12:31 


 


Total Bilirubin  0.20  (0.1-1.2)  mg/dL


 


AST  13  (5-40)  units/L


 


ALT  10  (7-56)  units/L


 


Alkaline Phosphatase  70  ()  units/L


 


Total Protein  7.1  (6.3-8.2)  g/dL


 


Albumin  4.2  (3.9-5)  g/dL














Assessment and Plan





This is a 31-year-old female who presents to the department today with a primary

 complaint of right flank, right lower quadrant pain, right posterior flank 

pain, present for 4 years, this got worse over the past week.  She also endorses

 vaginal bleeding.  Questionable dysuria.  No history of vaginal discharge.  

Also endorses nonspecific right-sided foot numbness.





CT of abdomen and indicates possible early acute appendicitis.  Patient poorly c

ompliant with n.p.o. request had fast food brought to her by her  and 

eaten yesterday afternoon during evaluation for abdominal pain.





CT reviewed with Dr. Myers this am. No sign of acute appendicitis seen. Pt to be

 advanced to a regular diet and when tolerated DC'd. Fu with me in one week.